# Patient Record
Sex: MALE
[De-identification: names, ages, dates, MRNs, and addresses within clinical notes are randomized per-mention and may not be internally consistent; named-entity substitution may affect disease eponyms.]

---
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
## 2021-07-20 NOTE — EDM.PDOC
ED HPI GENERAL MEDICAL PROBLEM





- General


Chief Complaint: Upper Extremity Injury/Pain


Stated Complaint: HIT LT HAND WITH HAMMER


Time Seen by Provider: 07/20/21 17:34


Source of Information: Reports: Patient


History Limitations: Reports: No Limitations





- History of Present Illness


INITIAL COMMENTS - FREE TEXT/NARRATIVE: 





Patient is a 21-year-old male presents today for left hand pain.  Patient dates 

that he was using a hammer when he slipped and hit the hand with a hammer.  He 

iced it and swelling went down but as day went on the swelling came back.  

Patient has full function is able to squeeze it.  He does have some pain with 

making a tight fist pain does not radiate.  Patient not made worse or any other 

events.  He has not tried any Tylenol medicine at home. 


  ** left hand


Pain Score (Numeric/FACES): 5





- Related Data


                                    Allergies











Allergy/AdvReac Type Severity Reaction Status Date / Time


 


No Known Allergies Allergy   Verified 07/20/21 17:46











Home Meds: 


                                    Home Meds





. [No Known Home Meds]  07/20/21 [History]











Past Medical History





- Past Health History


Medical/Surgical History: Denies Medical/Surgical History





Social & Family History





- Family History


Family Medical History: No Pertinent Family History





- Tobacco Use


Tobacco Use Status *Q: Current Every Day Tobacco User


Years of Tobacco use: 2


Packs/Tins Daily: 0.5





- Recreational Drug Use


Recreational Drug Use: No





Review of Systems





- Review of Systems


Review Of Systems: See Below


Constitutional: Reports: No Symptoms


Eyes: Reports: No Symptoms


Ears: Reports: No Symptoms


Nose: Reports: No Symptoms


Mouth/Throat: Reports: No Symptoms


Respiratory: Reports: No Symptoms


Cardiovascular: Reports: No Symptoms


GI/Abdominal: Reports: No Symptoms


Genitourinary: Reports: No Symptoms


Musculoskeletal: Reports: Hand Pain


Skin: Reports: No Symptoms


Neurological: Reports: No Symptoms


Psychiatric: Reports: No Symptoms





ED EXAM, GENERAL





- Physical Exam


Exam: See Below


Exam Limited By: No Limitations


General Appearance: Alert, WD/WN, No Apparent Distress


Respiratory/Chest: No Respiratory Distress, Lungs Clear


Cardiovascular: Normal Peripheral Pulses, Regular Rate, Rhythm


GI/Abdominal: Normal Bowel Sounds, Soft, Non-Tender


Extremities: Normal Range of Motion, Non-Tender.  No: Normal Inspection 

(Swelling to the dorsal side left hand)


Neurological: Alert, Oriented





Course





- Vital Signs


Last Recorded V/S: 


                                Last Vital Signs











Temp  97.4 F   07/20/21 17:43


 


Pulse  78   07/20/21 17:43


 


Resp  16   07/20/21 17:43


 


BP  145/92 H  07/20/21 17:43


 


Pulse Ox  98   07/20/21 17:43














Departure





- Departure


Time of Disposition: 18:54


Disposition: Home, Self-Care 01


Condition: Good


Clinical Impression: 


 Hand sprain








- Discharge Information


*PRESCRIPTION DRUG MONITORING PROGRAM REVIEWED*: Not Applicable


*COPY OF PRESCRIPTION DRUG MONITORING REPORT IN PATIENT AIDE: Not Applicable


Instructions:  Intermetacarpal Sprain


Referrals: 


PCP,None [Primary Care Provider] - 


Forms:  ED Department Discharge


Additional Instructions: 


The following information is given to patients seen in the emergency department 

who are being discharged to home. This information is to outline your options 

for follow-up care. We provide all patients seen in our emergency department 

with a follow-up referral.





The need for follow-up, as well as the timing and circumstances, are variable 

depending upon the specifics of your emergency department visit.





If you don't have a primary care physician on staff, we will provide you with a 

referral. We always advise you to contact your personal physician following an 

emergency department visit to inform them of the circumstance of the visit and 

for follow-up with them and/or the need for any referrals to a consulting 

specialist.





The emergency department will also refer you to a specialist when appropriate. 

This referral assures that you have the opportunity for follow-up care with a 

specialist. All of these measure are taken in an effort to provide you with 

optimal care, which includes your follow-up.





Under all circumstances we always encourage you to contact your private 

physician who remains a resource for coordinating your care. When calling for 

follow-up care, please make the office aware that this follow-up is from your 

recent emergency room visit. If for any reason you are refused follow-up, please

contact the [facility] Emergency Department

at [phone] and asked to speak to the emergency department charge nurse.





Please follow up with your primary care physician. If you do not have a primary 

care physician, see below:


[facility]


[address] [place] [id number] [phone]





[facility]


[address] [place] [address]


[phone]








Seen today at the head near self and hand with a hammer we did x-rays not show 

any acute fractures we will discharge you home.  Please follow-up to primary 

care physician as needed for any increased pain.  You have any other concerning 

symptoms return to ED.





Sepsis Event Note (ED)





- Evaluation


Sepsis Screening Result: No Definite Risk





- Focused Exam


Vital Signs: 


                                   Vital Signs











  Temp Pulse Resp BP Pulse Ox


 


 07/20/21 17:43  97.4 F  78  16  145/92 H  98














- Assessment/Plan


Plan: 





Patient is a 21-year-old male presents today for left hand swelling.  Patient 

hit with a hammer.  Patient has good range of motion no areas of tenderness.  

Will obtain x-rays and reassess.